# Patient Record
Sex: FEMALE | Race: WHITE | NOT HISPANIC OR LATINO | Employment: OTHER | URBAN - METROPOLITAN AREA
[De-identification: names, ages, dates, MRNs, and addresses within clinical notes are randomized per-mention and may not be internally consistent; named-entity substitution may affect disease eponyms.]

---

## 2022-11-11 NOTE — PATIENT INSTRUCTIONS
"Glasses prescription given - optional  May use artificial tears up to four times a day (like Refresh Optive, Systane Balance, TheraTears, or generic artificial tears are ok. Avoid \"get the red out\" drops).   Possible posterior vitreous detachment (sudden onset large floater and/or flashing lights) both eyes discussed.   Take a multiple vitamin.  Protect your eyes outdoors from ultraviolet rays with sunglasses and/or brimmed hat.  Have spinach (cooked or raw), colorful fruits, walnuts, hazelnuts, almonds in your diet.  Monitor the vision in each eye weekly - call if any sudden persistent changes.   Call in July 2023 for an appointment in November 2023 for Complete Exam and retinal OCT     Dr. Ch [phone]    "

## 2022-11-14 ENCOUNTER — OFFICE VISIT (OUTPATIENT)
Dept: OPHTHALMOLOGY | Facility: CLINIC | Age: 62
End: 2022-11-14
Payer: OTHER GOVERNMENT

## 2022-11-14 DIAGNOSIS — H35.343 MACULAR RETINAL CYST OF BOTH EYES: Primary | ICD-10-CM

## 2022-11-14 DIAGNOSIS — H35.363 DRUSEN OF MACULA OF BOTH EYES: ICD-10-CM

## 2022-11-14 DIAGNOSIS — H52.4 PRESBYOPIA: ICD-10-CM

## 2022-11-14 PROCEDURE — 92015 DETERMINE REFRACTIVE STATE: CPT | Performed by: OPHTHALMOLOGY

## 2022-11-14 PROCEDURE — 92134 CPTRZ OPH DX IMG PST SGM RTA: CPT | Performed by: OPHTHALMOLOGY

## 2022-11-14 PROCEDURE — 92004 COMPRE OPH EXAM NEW PT 1/>: CPT | Performed by: OPHTHALMOLOGY

## 2022-11-14 RX ORDER — METOPROLOL SUCCINATE 25 MG/1
TABLET, EXTENDED RELEASE ORAL
COMMUNITY
Start: 2022-09-27

## 2022-11-14 RX ORDER — CITALOPRAM HYDROBROMIDE 20 MG/1
10 TABLET ORAL
COMMUNITY
Start: 2022-09-27

## 2022-11-14 ASSESSMENT — REFRACTION_MANIFEST
OS_AXIS: 026
OD_SPHERE: -3.00
OS_CYLINDER: +1.25
OD_CYLINDER: +1.00
OS_ADD: +2.75
OS_SPHERE: -1.50
OD_ADD: +2.75
OD_AXIS: 180

## 2022-11-14 ASSESSMENT — VISUAL ACUITY
METHOD: SNELLEN - LINEAR
OS_CC+: -3
CORRECTION_TYPE: GLASSES
OD_PH_CC: 20/25
OD_CC: 20/40
OS_CC: 20/20

## 2022-11-14 ASSESSMENT — EXTERNAL EXAM - LEFT EYE: OS_EXAM: NORMAL

## 2022-11-14 ASSESSMENT — REFRACTION_WEARINGRX
OD_ADD: +2.50
OS_ADD: +2.50
OS_AXIS: 030
OD_AXIS: 153
OD_CYLINDER: +1.00
OD_SPHERE: -2.25
OS_CYLINDER: +1.50
OS_SPHERE: -2.00

## 2022-11-14 ASSESSMENT — SLIT LAMP EXAM - LIDS
COMMENTS: 1+ DERMATOCHALASIS
COMMENTS: 1+ DERMATOCHALASIS

## 2022-11-14 ASSESSMENT — TONOMETRY
OS_IOP_MMHG: 17
IOP_METHOD: APPLANATION
OD_IOP_MMHG: 18

## 2022-11-14 ASSESSMENT — EXTERNAL EXAM - RIGHT EYE: OD_EXAM: NORMAL

## 2022-11-14 ASSESSMENT — CUP TO DISC RATIO
OD_RATIO: 0.4
OS_RATIO: 0.3

## 2022-11-14 NOTE — LETTER
"    11/14/2022         RE: Griselda Olivera  [address] [facility] [address]        Dear Colleague,    Thank you for referring your patient, Griselda Olivera, to the [facility]. Please see a copy of my visit note below.     Current Eye Medications:  MVI daily.     Subjective:  She had a Routine exam in February of this year while she was in [place], and was told she had macular degeneration.  She has not noticed any vision problems or changes.  Ary Blanchard is her Father (he also has macular degeneration).  He recommended she see Dr. Ch.    No family history of glaucoma.    No history of eye injuries or surgery.      Objective:  See Ophthalmology Exam.       Assessment:  Baseline eye exam in patient with macular cystic changes right eye > left eye.  Mild refractive shift.      ICD-10-CM    1. Macular retinal cyst of both eyes  H35.343       2. Drusen of macula of both eyes  H35.363       3. Presbyopia  H52.4            Plan:  Glasses prescription given - optional  May use artificial tears up to four times a day (like Refresh Optive, Systane Balance, TheraTears, or generic artificial tears are ok. Avoid \"get the red out\" drops).   Possible posterior vitreous detachment (sudden onset large floater and/or flashing lights) both eyes discussed.   Take a multiple vitamin.  Protect your eyes outdoors from ultraviolet rays with sunglasses and/or brimmed hat.  Have spinach (cooked or raw), colorful fruits, walnuts, hazelnuts, almonds in your diet.  Monitor the vision in each eye weekly - call if any sudden persistent changes.   Call in July 2023 for an appointment in November 2023 for Complete Exam and retinal OCT     Dr. Ch [phone]           Again, thank you for allowing me to participate in the care of your patient.        Sincerely,        Vitaly hC MD    "

## 2022-11-14 NOTE — PROGRESS NOTES
" Current Eye Medications:  MVI daily.     Subjective:  She had a Routine exam in February of this year while she was in [place], and was told she had macular degeneration.  She has not noticed any vision problems or changes.  Ary Blanchard is her Father (he also has macular degeneration).  He recommended she see Dr. Ch.    No family history of glaucoma.    No history of eye injuries or surgery.      Objective:  See Ophthalmology Exam.       Assessment:  Baseline eye exam in patient with macular cystic changes right eye > left eye.  Mild refractive shift.      ICD-10-CM    1. Macular retinal cyst of both eyes  H35.343       2. Drusen of macula of both eyes  H35.363       3. Presbyopia  H52.4            Plan:  Glasses prescription given - optional  May use artificial tears up to four times a day (like Refresh Optive, Systane Balance, TheraTears, or generic artificial tears are ok. Avoid \"get the red out\" drops).   Possible posterior vitreous detachment (sudden onset large floater and/or flashing lights) both eyes discussed.   Take a multiple vitamin.  Protect your eyes outdoors from ultraviolet rays with sunglasses and/or brimmed hat.  Have spinach (cooked or raw), colorful fruits, walnuts, hazelnuts, almonds in your diet.  Monitor the vision in each eye weekly - call if any sudden persistent changes.   Call in July 2023 for an appointment in November 2023 for Complete Exam and retinal OCT     Dr. Ch [phone]       "

## 2022-11-26 PROBLEM — H35.343: Status: ACTIVE | Noted: 2022-11-26

## 2022-11-26 PROBLEM — H35.363 DRUSEN OF MACULA OF BOTH EYES: Status: ACTIVE | Noted: 2022-11-26
